# Patient Record
Sex: FEMALE | Race: WHITE | NOT HISPANIC OR LATINO | URBAN - METROPOLITAN AREA
[De-identification: names, ages, dates, MRNs, and addresses within clinical notes are randomized per-mention and may not be internally consistent; named-entity substitution may affect disease eponyms.]

---

## 2017-10-03 ENCOUNTER — EMERGENCY (EMERGENCY)
Facility: HOSPITAL | Age: 62
LOS: 1 days | Discharge: HOME | End: 2017-10-03
Attending: EMERGENCY MEDICINE | Admitting: EMERGENCY MEDICINE
Payer: COMMERCIAL

## 2017-10-03 VITALS
HEIGHT: 63 IN | HEART RATE: 56 BPM | TEMPERATURE: 98 F | RESPIRATION RATE: 18 BRPM | WEIGHT: 195.11 LBS | SYSTOLIC BLOOD PRESSURE: 138 MMHG | DIASTOLIC BLOOD PRESSURE: 78 MMHG | OXYGEN SATURATION: 99 %

## 2017-10-03 VITALS
TEMPERATURE: 98 F | SYSTOLIC BLOOD PRESSURE: 130 MMHG | HEART RATE: 60 BPM | OXYGEN SATURATION: 99 % | RESPIRATION RATE: 16 BRPM | DIASTOLIC BLOOD PRESSURE: 76 MMHG

## 2017-10-03 DIAGNOSIS — E78.5 HYPERLIPIDEMIA, UNSPECIFIED: ICD-10-CM

## 2017-10-03 DIAGNOSIS — I10 ESSENTIAL (PRIMARY) HYPERTENSION: ICD-10-CM

## 2017-10-03 DIAGNOSIS — E78.00 PURE HYPERCHOLESTEROLEMIA, UNSPECIFIED: ICD-10-CM

## 2017-10-03 DIAGNOSIS — R07.2 PRECORDIAL PAIN: ICD-10-CM

## 2017-10-03 DIAGNOSIS — Z79.899 OTHER LONG TERM (CURRENT) DRUG THERAPY: ICD-10-CM

## 2017-10-03 LAB
ALBUMIN SERPL ELPH-MCNC: 4.3 G/DL — SIGNIFICANT CHANGE UP (ref 3.3–5)
ALP SERPL-CCNC: 74 U/L — SIGNIFICANT CHANGE UP (ref 40–120)
ALT FLD-CCNC: 22 U/L — SIGNIFICANT CHANGE UP (ref 10–45)
ANION GAP SERPL CALC-SCNC: 12 MMOL/L — SIGNIFICANT CHANGE UP (ref 5–17)
APTT BLD: 30.4 SEC — SIGNIFICANT CHANGE UP (ref 27.5–37.4)
AST SERPL-CCNC: 20 U/L — SIGNIFICANT CHANGE UP (ref 10–40)
BILIRUB SERPL-MCNC: 0.3 MG/DL — SIGNIFICANT CHANGE UP (ref 0.2–1.2)
BUN SERPL-MCNC: 13 MG/DL — SIGNIFICANT CHANGE UP (ref 7–23)
CALCIUM SERPL-MCNC: 9.4 MG/DL — SIGNIFICANT CHANGE UP (ref 8.4–10.5)
CHLORIDE SERPL-SCNC: 98 MMOL/L — SIGNIFICANT CHANGE UP (ref 96–108)
CK MB CFR SERPL CALC: 2.6 NG/ML — SIGNIFICANT CHANGE UP (ref 0–6.7)
CK MB CFR SERPL CALC: 2.8 NG/ML — SIGNIFICANT CHANGE UP (ref 0–6.7)
CK SERPL-CCNC: 125 U/L — SIGNIFICANT CHANGE UP (ref 25–170)
CK SERPL-CCNC: 135 U/L — SIGNIFICANT CHANGE UP (ref 25–170)
CO2 SERPL-SCNC: 28 MMOL/L — SIGNIFICANT CHANGE UP (ref 22–31)
CREAT SERPL-MCNC: 0.95 MG/DL — SIGNIFICANT CHANGE UP (ref 0.5–1.3)
EOSINOPHIL NFR BLD AUTO: 1.2 % — SIGNIFICANT CHANGE UP (ref 0–6)
GLUCOSE SERPL-MCNC: 95 MG/DL — SIGNIFICANT CHANGE UP (ref 70–99)
HCT VFR BLD CALC: 38.5 % — SIGNIFICANT CHANGE UP (ref 34.5–45)
HGB BLD-MCNC: 12.6 G/DL — SIGNIFICANT CHANGE UP (ref 11.5–15.5)
INR BLD: 1 — SIGNIFICANT CHANGE UP (ref 0.88–1.16)
LYMPHOCYTES # BLD AUTO: 34.1 % — SIGNIFICANT CHANGE UP (ref 13–44)
MCHC RBC-ENTMCNC: 27.5 PG — SIGNIFICANT CHANGE UP (ref 27–34)
MCHC RBC-ENTMCNC: 32.7 G/DL — SIGNIFICANT CHANGE UP (ref 32–36)
MCV RBC AUTO: 83.9 FL — SIGNIFICANT CHANGE UP (ref 80–100)
MONOCYTES NFR BLD AUTO: 7.3 % — SIGNIFICANT CHANGE UP (ref 2–14)
NEUTROPHILS NFR BLD AUTO: 57.4 % — SIGNIFICANT CHANGE UP (ref 43–77)
PLATELET # BLD AUTO: 183 K/UL — SIGNIFICANT CHANGE UP (ref 150–400)
POTASSIUM SERPL-MCNC: 4 MMOL/L — SIGNIFICANT CHANGE UP (ref 3.5–5.3)
POTASSIUM SERPL-SCNC: 4 MMOL/L — SIGNIFICANT CHANGE UP (ref 3.5–5.3)
PROT SERPL-MCNC: 6.9 G/DL — SIGNIFICANT CHANGE UP (ref 6–8.3)
PROTHROM AB SERPL-ACNC: 11.1 SEC — SIGNIFICANT CHANGE UP (ref 9.8–12.7)
RBC # BLD: 4.59 M/UL — SIGNIFICANT CHANGE UP (ref 3.8–5.2)
RBC # FLD: 13.8 % — SIGNIFICANT CHANGE UP (ref 10.3–16.9)
SODIUM SERPL-SCNC: 138 MMOL/L — SIGNIFICANT CHANGE UP (ref 135–145)
TROPONIN T SERPL-MCNC: <0.01 NG/ML — SIGNIFICANT CHANGE UP (ref 0–0.01)
TROPONIN T SERPL-MCNC: <0.01 NG/ML — SIGNIFICANT CHANGE UP (ref 0–0.01)
WBC # BLD: 5.6 K/UL — SIGNIFICANT CHANGE UP (ref 3.8–10.5)
WBC # FLD AUTO: 5.6 K/UL — SIGNIFICANT CHANGE UP (ref 3.8–10.5)

## 2017-10-03 PROCEDURE — 99283 EMERGENCY DEPT VISIT LOW MDM: CPT | Mod: 25

## 2017-10-03 PROCEDURE — 93005 ELECTROCARDIOGRAM TRACING: CPT

## 2017-10-03 PROCEDURE — 85730 THROMBOPLASTIN TIME PARTIAL: CPT

## 2017-10-03 PROCEDURE — 84484 ASSAY OF TROPONIN QUANT: CPT

## 2017-10-03 PROCEDURE — 82553 CREATINE MB FRACTION: CPT

## 2017-10-03 PROCEDURE — 71010: CPT | Mod: 26

## 2017-10-03 PROCEDURE — 93010 ELECTROCARDIOGRAM REPORT: CPT | Mod: 59

## 2017-10-03 PROCEDURE — 80053 COMPREHEN METABOLIC PANEL: CPT

## 2017-10-03 PROCEDURE — 99285 EMERGENCY DEPT VISIT HI MDM: CPT | Mod: 25

## 2017-10-03 PROCEDURE — 36415 COLL VENOUS BLD VENIPUNCTURE: CPT

## 2017-10-03 PROCEDURE — 85025 COMPLETE CBC W/AUTO DIFF WBC: CPT

## 2017-10-03 PROCEDURE — 85610 PROTHROMBIN TIME: CPT

## 2017-10-03 PROCEDURE — 82550 ASSAY OF CK (CPK): CPT

## 2017-10-03 PROCEDURE — 71045 X-RAY EXAM CHEST 1 VIEW: CPT

## 2017-10-03 RX ORDER — ATORVASTATIN CALCIUM 80 MG/1
0 TABLET, FILM COATED ORAL
Qty: 0 | Refills: 0 | COMMUNITY

## 2017-10-03 RX ORDER — ATENOLOL 25 MG/1
0 TABLET ORAL
Qty: 0 | Refills: 0 | COMMUNITY

## 2017-10-03 RX ORDER — CETIRIZINE HYDROCHLORIDE 10 MG/1
0 TABLET ORAL
Qty: 0 | Refills: 0 | COMMUNITY

## 2017-10-03 RX ORDER — LISINOPRIL 2.5 MG/1
0 TABLET ORAL
Qty: 0 | Refills: 0 | COMMUNITY

## 2017-10-03 NOTE — ED ADULT TRIAGE NOTE - CHIEF COMPLAINT QUOTE
PT BIBA from [facility] [place] 2 episodes of CP, once today and the second today.  Pt states "I went to [facility] and when the second episode started they called the ambulance to come here."  Pt admin 325 poi ASA @ [facility], tolerated well.  PT denies N/V/D, SOB, Fevers at this time.

## 2017-10-03 NOTE — ED ADULT NURSE NOTE - CHIEF COMPLAINT QUOTE
PT BIBA from [facility] [place] 2 episodes of CP, once today and the second today.  Pt states "I went to [facility] and when the second episode started they called the ambulance to come here."  Pt admin 325 poi ASA @ [facility], tolerated well.  PT denies N/V/D, SOB, Fevers at this time.

## 2017-10-03 NOTE — ED PROVIDER NOTE - OBJECTIVE STATEMENT
61 yo old female with hx of HTN, HLD presents with 2 episodes of CP since yesterday. [place] when she had substernal chest pressure, "clench" sensation, radiating to her neck and left shoulder which lasted approx 20 mins. States taking "Deep breaths made it better". Today while at work she had another similar episode that lasted approx 10-15 mins. No SOB/ cough/ fevers/ chills. Pt went to a [facility] and was sent to [facility] ED for evaluation. No diaphoresis/ LOC/ dizziness. Pt had similar sxs approx 2 years ago and had an exercise stress test done by her cardiologist Dr. Elisa Richards (at another hospital) which she reports was negative. Pt currently sx-free. States she has been undergoing a lot of stress with work and home over the last couple of weeks.

## 2017-10-03 NOTE — ED PROVIDER NOTE - MEDICAL DECISION MAKING DETAILS
61 yo female presents with 2 episodes of chest pain. Labs/ studies noted. Troponin X 2 in ED negative. Pt to f/up with her cardiologist in 2 days for further w/up. Has already made an appointment. Advised to return to ED if she develops any concerning sxs.

## 2017-10-03 NOTE — ED ADULT NURSE REASSESSMENT NOTE - NS ED NURSE REASSESS COMMENT FT1
pt in no acute distress. safety maintained. eating food, friend at bedside. will continue to monitor.

## 2023-01-18 ENCOUNTER — NON-APPOINTMENT (OUTPATIENT)
Age: 68
End: 2023-01-18

## 2023-01-18 PROBLEM — Z00.00 ENCOUNTER FOR PREVENTIVE HEALTH EXAMINATION: Status: ACTIVE | Noted: 2023-01-18

## 2023-01-18 PROBLEM — I10 ESSENTIAL (PRIMARY) HYPERTENSION: Chronic | Status: ACTIVE | Noted: 2017-10-03

## 2023-01-18 PROBLEM — E78.00 PURE HYPERCHOLESTEROLEMIA, UNSPECIFIED: Chronic | Status: ACTIVE | Noted: 2017-10-03

## 2023-01-19 ENCOUNTER — NON-APPOINTMENT (OUTPATIENT)
Age: 68
End: 2023-01-19

## 2023-01-19 ENCOUNTER — APPOINTMENT (OUTPATIENT)
Dept: OTOLARYNGOLOGY | Facility: CLINIC | Age: 68
End: 2023-01-19
Payer: MEDICARE

## 2023-01-19 VITALS
OXYGEN SATURATION: 100 % | DIASTOLIC BLOOD PRESSURE: 81 MMHG | RESPIRATION RATE: 16 BRPM | HEART RATE: 60 BPM | SYSTOLIC BLOOD PRESSURE: 152 MMHG

## 2023-01-19 VITALS
OXYGEN SATURATION: 100 % | DIASTOLIC BLOOD PRESSURE: 82 MMHG | SYSTOLIC BLOOD PRESSURE: 177 MMHG | HEART RATE: 65 BPM | TEMPERATURE: 97.3 F | WEIGHT: 163 LBS | RESPIRATION RATE: 16 BRPM | HEIGHT: 63 IN | BODY MASS INDEX: 28.88 KG/M2

## 2023-01-19 DIAGNOSIS — H61.23 IMPACTED CERUMEN, BILATERAL: ICD-10-CM

## 2023-01-19 PROCEDURE — 99204 OFFICE O/P NEW MOD 45 MIN: CPT

## 2023-01-19 NOTE — ASSESSMENT
[FreeTextEntry1] : 1. vertigo likely d/t vestibular neuritis \par -\par -MRI\par -VNG\par -US Carotid Dopplers\par -bonine for extreme episodes of dizziness\par 2. b cerumen impaction \par -cerumen removed\par -avoid qtip usage\par RTC with , MRI, VNG, US Carotid Dopplers to review findings

## 2023-01-19 NOTE — HISTORY OF PRESENT ILLNESS
[de-identified] : 66 y/o F presenting with vertigo about a week ago. She woke up and it felt like the room was spinning. She was nauseated and vomited afterwards. She had a headache at the time and has h/o migraines. She states dizziness lasted all day. She spoke to PCP who recommended she take Bonine; she states this helped nausea. That day she felt she could not walk. Over the past week it has improved about 90%. She still has intermittent episodes especially when she changes her head positions and it lasts for a few seconds. She saw her PCP who sent her for CT scan and she was told it was nl (no results available) and recommended she come here. Her PCP examined her heart and lungs and said they were ok. She denies hearing loss, tinnitus, or pressure in the ears. She denies recent uri/ colds/ flu. She has never been dizzy in the past. She has occasional ear pain, that she describes as a zap. No FH or SH pertinent to cc. Former smoker.

## 2023-01-19 NOTE — PHYSICAL EXAM
[Normal] : no nystagmus [de-identified] : b scant cerumen removed atraumatically with curette and alligator forceps l>r, b TMs nl  [] : Marshall-Hallpike test is negative [de-identified] : gait steady

## 2023-02-02 ENCOUNTER — APPOINTMENT (OUTPATIENT)
Dept: OTOLARYNGOLOGY | Facility: CLINIC | Age: 68
End: 2023-02-02
Payer: MEDICARE

## 2023-02-02 VITALS
SYSTOLIC BLOOD PRESSURE: 164 MMHG | BODY MASS INDEX: 28.88 KG/M2 | WEIGHT: 163 LBS | OXYGEN SATURATION: 98 % | HEIGHT: 63 IN | HEART RATE: 61 BPM | DIASTOLIC BLOOD PRESSURE: 95 MMHG | TEMPERATURE: 98 F

## 2023-02-02 VITALS — HEART RATE: 61 BPM | SYSTOLIC BLOOD PRESSURE: 156 MMHG | DIASTOLIC BLOOD PRESSURE: 83 MMHG

## 2023-02-02 DIAGNOSIS — H90.3 SENSORINEURAL HEARING LOSS, BILATERAL: ICD-10-CM

## 2023-02-02 DIAGNOSIS — R42 DIZZINESS AND GIDDINESS: ICD-10-CM

## 2023-02-02 PROCEDURE — 99213 OFFICE O/P EST LOW 20 MIN: CPT

## 2023-02-02 PROCEDURE — 92550 TYMPANOMETRY & REFLEX THRESH: CPT | Mod: 52

## 2023-02-02 PROCEDURE — 92540 BASIC VESTIBULAR EVALUATION: CPT

## 2023-02-02 PROCEDURE — 92537 CALORIC VSTBLR TEST W/REC: CPT

## 2023-02-02 PROCEDURE — 92557 COMPREHENSIVE HEARING TEST: CPT

## 2023-02-02 RX ORDER — METHYLPREDNISOLONE 4 MG/1
4 TABLET ORAL
Qty: 1 | Refills: 0 | Status: ACTIVE | COMMUNITY
Start: 2023-02-02 | End: 1900-01-01

## 2023-02-02 NOTE — DATA REVIEWED
[de-identified] : lorna henderson snhl reviewed with pt; sylvia cw aging reviewed with pt [de-identified] : mri mild sv isch dz doppler ok reviewed with pt

## 2023-02-02 NOTE — ASSESSMENT
[FreeTextEntry1] : snhl -= discussed hae- she prefers to hold off\par vng cw aging explained - v rehab offered - she agreed - ordered - recommended dominguez chi as well\par rtc 2 mo

## 2023-02-02 NOTE — HISTORY OF PRESENT ILLNESS
[de-identified] : 2 week follow up exam for this 68 yo f with vertigo - she had  vng mri and doppler and is  here to review results. She reports symptoms are mild but present intermittently.

## 2023-03-02 ENCOUNTER — APPOINTMENT (OUTPATIENT)
Dept: OTOLARYNGOLOGY | Facility: CLINIC | Age: 68
End: 2023-03-02

## 2025-05-30 NOTE — ED PROVIDER NOTE - MUSCULOSKELETAL, MLM
Education provided the Patient on the following:    - Nothing to Eat or Drink after MN the night before the procedure  - Avoid red/purple fluids while completing their bowel prep as ordered by physician  - Contact Gastrointerologist office for any questions about specific details regarding colon prep  - You will need to have someone drive you home after your colonoscopy and remain with you for 24 hours after the procedure  - The date of your Surgery, you may have one visitor at bedside or within 10-15 minutes of [facility]  - Please wear warm socks when you arrive for your colonoscopy  - Remove all jewelry and leave any valuables before arriving the day of your procedure (all will have to be removed before leaving preop)  - You will need to arrive at 0600 on 6-2-25 for your colonoscopy at [facility] located at [address], [address].  - Feel free to contact us at: [phone] with any additional questions/concerns     
Spine appears normal, range of motion is not limited